# Patient Record
Sex: MALE | Race: WHITE
[De-identification: names, ages, dates, MRNs, and addresses within clinical notes are randomized per-mention and may not be internally consistent; named-entity substitution may affect disease eponyms.]

---

## 2018-08-28 ENCOUNTER — HOSPITAL ENCOUNTER (EMERGENCY)
Dept: HOSPITAL 14 - H.ER | Age: 28
Discharge: HOME | End: 2018-08-28
Payer: COMMERCIAL

## 2018-08-28 VITALS
DIASTOLIC BLOOD PRESSURE: 62 MMHG | HEART RATE: 78 BPM | SYSTOLIC BLOOD PRESSURE: 107 MMHG | RESPIRATION RATE: 18 BRPM | TEMPERATURE: 98.5 F

## 2018-08-28 VITALS — BODY MASS INDEX: 25.7 KG/M2

## 2018-08-28 VITALS — OXYGEN SATURATION: 100 %

## 2018-08-28 DIAGNOSIS — F41.0: Primary | ICD-10-CM

## 2018-08-28 DIAGNOSIS — R07.89: ICD-10-CM

## 2018-08-28 DIAGNOSIS — R20.2: ICD-10-CM

## 2018-08-28 LAB
BASOPHILS # BLD AUTO: 0 K/UL (ref 0–0.2)
BASOPHILS NFR BLD: 0.3 % (ref 0–2)
BUN SERPL-MCNC: 9 MG/DL (ref 9–20)
CALCIUM SERPL-MCNC: 9.1 MG/DL (ref 8.4–10.2)
EOSINOPHIL # BLD AUTO: 0 K/UL (ref 0–0.7)
EOSINOPHIL NFR BLD: 0.8 % (ref 0–4)
ERYTHROCYTE [DISTWIDTH] IN BLOOD BY AUTOMATED COUNT: 13.1 % (ref 11.5–14.5)
GFR NON-AFRICAN AMERICAN: > 60
HGB BLD-MCNC: 15.3 G/DL (ref 12–18)
LYMPHOCYTES # BLD AUTO: 1.6 K/UL (ref 1–4.3)
LYMPHOCYTES NFR BLD AUTO: 28.1 % (ref 20–40)
MCH RBC QN AUTO: 32.1 PG (ref 27–31)
MCHC RBC AUTO-ENTMCNC: 35 G/DL (ref 33–37)
MCV RBC AUTO: 91.8 FL (ref 80–94)
MONOCYTES # BLD: 0.7 K/UL (ref 0–0.8)
MONOCYTES NFR BLD: 12.6 % (ref 0–10)
NEUTROPHILS # BLD: 3.4 K/UL (ref 1.8–7)
NEUTROPHILS NFR BLD AUTO: 58.2 % (ref 50–75)
NRBC BLD AUTO-RTO: 0.1 % (ref 0–0)
PLATELET # BLD: 208 K/UL (ref 130–400)
PMV BLD AUTO: 8.2 FL (ref 7.2–11.7)
RBC # BLD AUTO: 4.78 MIL/UL (ref 4.4–5.9)
WBC # BLD AUTO: 5.9 K/UL (ref 4.8–10.8)

## 2018-08-28 PROCEDURE — 71046 X-RAY EXAM CHEST 2 VIEWS: CPT

## 2018-08-28 PROCEDURE — 99283 EMERGENCY DEPT VISIT LOW MDM: CPT

## 2018-08-28 PROCEDURE — 96360 HYDRATION IV INFUSION INIT: CPT

## 2018-08-28 PROCEDURE — 80048 BASIC METABOLIC PNL TOTAL CA: CPT

## 2018-08-28 PROCEDURE — 84484 ASSAY OF TROPONIN QUANT: CPT

## 2018-08-28 PROCEDURE — 93005 ELECTROCARDIOGRAM TRACING: CPT

## 2018-08-28 PROCEDURE — 85025 COMPLETE CBC W/AUTO DIFF WBC: CPT

## 2018-08-28 NOTE — ED PDOC
HPI: General Adult


Time Seen by Provider: 08/28/18 07:14


Chief Complaint (Nursing): Anxiety


Chief Complaint (Provider): Anxiety


History Per: Patient


History/Exam Limitations: no limitations


Onset/Duration Of Symptoms: Other (Since June)


Additional Complaint(s): 


28 years old male presents to the ED complaining of chest tightness due to a 

panic attack onset this morning. Patient reports he has been experiencing 

multiple episodes of panic attack associated with weakness all over, chest 

tightness, shortness of breath, numbness of left arm and closing of throat 

since June. He states symptoms usually go away alone but chest tightness 

persisted this time. He denies any abdominal pain, vomiting, nausea, diarrhea, 

recent travel or hormone treatment.  No leg pain.  Not suicidal or homicidal.  

No injury. 


PMD: non provided





Past Medical History


Reviewed: Historical Data, Nursing Documentation, Vital Signs


Vital Signs: 


 Last Vital Signs











Temp  99.2 F   08/28/18 07:38


 


Pulse  84   08/28/18 07:38


 


Resp  19   08/28/18 07:38


 


BP  134/73   08/28/18 07:38


 


Pulse Ox  100   08/28/18 08:33














- Medical History


PMH: No Chronic Diseases





- Surgical History


Surgical History: Tonsillectomy (and adenoidectomy)





- Family History


Family History: States: Unknown Family Hx





- Social History


Current smoker - smoking cessation education provided: No


Alcohol: None


Drugs: Denies





- Home Medications


Home Medications: 


 Ambulatory Orders











 Medication  Instructions  Recorded


 


Dicyclomine [Bentyl] 20 mg PO Q12 PRN #20 tab 07/12/16


 


Polyethylene Glycol 3350 [Miralax] 17 g PO QAM PRN #7 pkg 07/12/16














- Allergies


Allergies/Adverse Reactions: 


 Allergies











Allergy/AdvReac Type Severity Reaction Status Date / Time


 


No Known Allergies Allergy   Verified 08/28/18 07:45














Review of Systems


ROS Statement: Except As Marked, All Systems Reviewed And Found Negative


Cardiovascular: Positive for: Chest Pain (tightness)


Respiratory: Positive for: Shortness of Breath


Gastrointestinal: Negative for: Nausea, Vomiting, Abdominal Pain


Neurological: Positive for: Weakness, Numbness (to left arm)





Physical Exam





- Reviewed


Nursing Documentation Reviewed: Yes


Vital Signs Reviewed: Yes





- Physical Exam


Appears: Positive for: Non-toxic, No Acute Distress


Head Exam: Positive for: ATRAUMATIC, NORMOCEPHALIC


Skin: Positive for: Normal Color, Warm, Dry


Eye Exam: Positive for: Normal appearance


ENT: Positive for: Normal ENT Inspection


Neck: Positive for: Normal, Painless ROM, Supple


Cardiovascular/Chest: Positive for: Regular Rate, Rhythm.  Negative for: Chest 

Non Tender (left mild), Murmur


Respiratory: Positive for: Normal Breath Sounds.  Negative for: Accessory 

Muscle Use, Respiratory Distress


Gastrointestinal/Abdominal: Positive for: Normal Exam, Soft.  Negative for: 

Tenderness


Back: Positive for: Normal Inspection.  Negative for: L CVA Tenderness, R CVA 

Tenderness


Extremity: Positive for: Normal ROM.  Negative for: Tenderness


Neurologic/Psych: Positive for: Alert, Oriented (x3)





- Laboratory Results


Result Diagrams: 


 08/28/18 08:08





 08/28/18 08:08


Interpretation Of Abn Labs: no acute





- ECG


O2 Sat by Pulse Oximetry: 100 (RA)


Pulse Ox Interpretation: Normal





- Radiology


X-Ray: Read By Radiologist


X-Ray Interpretation: No Acute Disease





- Progress


ED Course And Treament: 





1021:  Stable.  AAOx3.  Pain free.  Tolerated PO.  Crisis saw pt.  Does not 

meet criteria for admit.  FU with pcp.   





Medical Decision Making


Medical Decision Making: 


Time: 0733





Initial Plan:


--EKG


--BMP


--Crisis evaluation


--CBC


--Chest X-Ray 2 Views


--Motrin 600 mg PO


--NaCl 1,000 ml IV





0829


Chest X-Ray FINDINGS:





LINES AND TUBES:


None. 





LUNG AND PLEURA:


The lungs are well inflated and clear. There is bibasilar atelectasis. No 

pleural effusion or pneumothorax.





HEART AND MEDIASTINUM:


The heart is not enlarged. The hilar and mediastinal contours are within normal 

limits.





SKELETAL STRUCTURES:


The bony structures are within normal limits for the patient's age.





VISUALIZED UPPER ABDOMEN:


Normal.





OTHER FINDINGS:


None.





IMPRESSION:


No active pulmonary disease.





--------------------------------------------------------------------------------

-----------------


Scribe Attestation:


Documented by Marcelle Jimenez, acting as a scribe for Paulo Boland MD.





Provider Scribe Attestation:


All medical record entries made by the Scribe were at my direction and 

personally dictated by me. I have reviewed the chart and agree that the record 

accurately reflects my personal performance of the history, physical exam, 

medical decision making, and the department course for this patient. I have 

also personally directed, reviewed, and agree with the discharge instructions 

and disposition.





Disposition





- Clinical Impression


Clinical Impression: 


 Anxiety








- Patient ED Disposition


Is Patient to be Admitted: No


Counseled Patient/Family Regarding: Studies Performed, Diagnosis, Need For 

Followup





- Disposition


Referrals: 


[facility] [Outside] - 08/29/18


[facility] [Outside] - 08/29/18


Disposition: Routine/Home


Disposition Time: 10:22


Condition: STABLE


Additional Instructions: 


Return if not better in 3 days. 


Instructions:  Anxiety, Adult (DC)


Forms:  [facility] (English), [facility] ED School/Work Excuse

## 2018-08-28 NOTE — CARD
--------------- APPROVED REPORT --------------





Date of service: 08/28/2018



EKG Measurement

Heart [id number]

ID [id number]

[id number]

[id number]

[id number]



<Conclusion>

Normal sinus rhythm with sinus arrhythmia

Normal ECG

## 2018-08-28 NOTE — RAD
HISTORY:



COMPARISON:

No prior.



TECHNIQUE:

Chest PA and lateral



FINDINGS:



LINES AND TUBES:

None. 



LUNG AND PLEURA:

The lungs are well inflated and clear. There is bibasilar 

atelectasis. No pleural effusion or pneumothorax.



HEART AND MEDIASTINUM:

The heart is not enlarged. The hilar and mediastinal contours are 

within normal limits.



SKELETAL STRUCTURES:

The bony structures are within normal limits for the patient's age.



VISUALIZED UPPER ABDOMEN:

Normal.



OTHER FINDINGS:

None.



IMPRESSION:

No active pulmonary disease.